# Patient Record
Sex: MALE | Race: WHITE | URBAN - METROPOLITAN AREA
[De-identification: names, ages, dates, MRNs, and addresses within clinical notes are randomized per-mention and may not be internally consistent; named-entity substitution may affect disease eponyms.]

---

## 2017-01-30 ENCOUNTER — APPOINTMENT (RX ONLY)
Dept: URBAN - METROPOLITAN AREA CLINIC 45 | Facility: CLINIC | Age: 57
Setting detail: DERMATOLOGY
End: 2017-01-30

## 2017-01-30 DIAGNOSIS — L40.0 PSORIASIS VULGARIS: ICD-10-CM

## 2017-01-30 PROCEDURE — ? TREATMENT REGIMEN

## 2017-01-30 PROCEDURE — 99213 OFFICE O/P EST LOW 20 MIN: CPT

## 2017-01-30 PROCEDURE — ? PRESCRIPTION

## 2017-01-30 PROCEDURE — ? COUNSELING

## 2017-01-30 RX ORDER — CALCIPOTRIENE 50 UG/G
CREAM TOPICAL
Qty: 1 | Refills: 2 | Status: ERX | COMMUNITY
Start: 2017-01-30

## 2017-01-30 RX ORDER — TRIAMCINOLONE ACETONIDE 1 MG/G
CREAM TOPICAL
Qty: 1 | Refills: 2 | Status: ERX

## 2017-01-30 RX ADMIN — CALCIPOTRIENE: 50 CREAM TOPICAL at 14:50

## 2017-01-30 ASSESSMENT — LOCATION DETAILED DESCRIPTION DERM
LOCATION DETAILED: LEFT PROXIMAL PRETIBIAL REGION
LOCATION DETAILED: RIGHT VENTRAL PROXIMAL FOREARM
LOCATION DETAILED: LEFT VENTRAL PROXIMAL FOREARM
LOCATION DETAILED: RIGHT RADIAL DORSAL HAND
LOCATION DETAILED: LEFT RADIAL DORSAL HAND

## 2017-01-30 ASSESSMENT — LOCATION SIMPLE DESCRIPTION DERM
LOCATION SIMPLE: RIGHT HAND
LOCATION SIMPLE: LEFT HAND
LOCATION SIMPLE: LEFT FOREARM
LOCATION SIMPLE: RIGHT FOREARM
LOCATION SIMPLE: LEFT PRETIBIAL REGION

## 2017-01-30 ASSESSMENT — LOCATION ZONE DERM
LOCATION ZONE: HAND
LOCATION ZONE: LEG
LOCATION ZONE: ARM

## 2017-01-30 NOTE — PROCEDURE: TREATMENT REGIMEN
Detail Level: Zone
Continue Regimen: Triamcinolone 0.1% cream bid then prn
Start Regimen: Calcipotriene qd Mon-Fri  then TAC only on weekends bid
Action 4: Continue
Other Instructions: Gave information on Rhuemotologists; Dr. Katy Piedra and Dr. Agrawal